# Patient Record
Sex: MALE | URBAN - NONMETROPOLITAN AREA
[De-identification: names, ages, dates, MRNs, and addresses within clinical notes are randomized per-mention and may not be internally consistent; named-entity substitution may affect disease eponyms.]

---

## 2021-04-21 ENCOUNTER — OFFICE VISIT (OUTPATIENT)
Dept: URBAN - NONMETROPOLITAN AREA CLINIC 13 | Facility: CLINIC | Age: 56
End: 2021-04-21
Payer: COMMERCIAL

## 2021-04-21 DIAGNOSIS — R10.13 EPIGASTRIC PAIN: ICD-10-CM

## 2021-04-21 DIAGNOSIS — M10.9 GOUT, UNSPECIFIED CAUSE, UNSPECIFIED CHRONICITY, UNSPECIFIED SITE: ICD-10-CM

## 2021-04-21 DIAGNOSIS — D64.9 ANEMIA, UNSPECIFIED TYPE: ICD-10-CM

## 2021-04-21 DIAGNOSIS — Z12.11 COLON CANCER SCREENING: ICD-10-CM

## 2021-04-21 PROCEDURE — 99204 OFFICE O/P NEW MOD 45 MIN: CPT | Performed by: INTERNAL MEDICINE

## 2021-04-21 RX ORDER — LOSARTAN POTASSIUM 50 MG/1
1 TABLET TABLET, FILM COATED ORAL ONCE A DAY
Status: ACTIVE | COMMUNITY

## 2021-04-21 RX ORDER — PANTOPRAZOLE SODIUM 40 MG/1
1 TABLET TABLET, DELAYED RELEASE ORAL ONCE A DAY
Status: ACTIVE | COMMUNITY

## 2021-04-21 RX ORDER — AMLODIPINE BESYLATE 10 MG/1
1 TABLET TABLET ORAL ONCE A DAY
Status: ACTIVE | COMMUNITY

## 2021-04-21 RX ORDER — ALLOPURINOL 300 MG/1
1 TABLET TABLET ORAL ONCE A DAY
Status: ACTIVE | COMMUNITY

## 2021-04-21 RX ORDER — ATENOLOL 100 MG/1
1 TABLET TABLET ORAL ONCE A DAY
Status: ACTIVE | COMMUNITY

## 2021-04-21 NOTE — HPI-TODAY'S VISIT:
4/21/2021: Initial Gastroenterology Clinic Visit  55 year old gentleman wtih past medical history of hypertension, GERD, pre-diabetes, gout, who presents for evaluatoin of epigastric discomfort/right upper quadrant pain.  Mr. Garay notes that he recently injured his knee leading to a gout flare. He was trialed on prednisone, indomethacin, meloxcam, colchicine. During the time he was treated for gout he began to experience epigastric discomfort/right upper quadrant discomfort. He also began to notice dark stool.  He was trialed on sucralfate which led to improvement of his epigastric/right upper quadrant discomfort. He is currently on pantoprazole 40 mg PO daily.   His bowel movements are currently brown.   He does experience mild post-prandial abdominal pressure.   His most recent labs were obtained on 4/19/2021 which showed a  WBC 5.80, hemoglobin 13.1, hematocrit 39.7, platelets 214.. Chemsitry panel and LFTs normal. A fecal globin was obtaned and was negative.  He states he is up to date on his colonoscopy .

## 2021-04-22 PROBLEM — 49436004 ATRIAL FIBRILLATION: Status: ACTIVE | Noted: 2021-04-22

## 2021-04-25 ENCOUNTER — DASHBOARD ENCOUNTERS (OUTPATIENT)
Age: 56
End: 2021-04-25

## 2021-04-25 PROBLEM — 305058001: Status: ACTIVE | Noted: 2021-04-25

## 2021-04-25 PROBLEM — 271737000: Status: ACTIVE | Noted: 2021-04-25

## 2021-04-25 PROBLEM — 79922009: Status: ACTIVE | Noted: 2021-04-21

## 2021-04-25 PROBLEM — 90560007: Status: ACTIVE | Noted: 2021-04-25

## 2021-05-27 ENCOUNTER — OFFICE VISIT (OUTPATIENT)
Dept: URBAN - NONMETROPOLITAN AREA SURGERY CENTER 1 | Facility: SURGERY CENTER | Age: 56
End: 2021-05-27

## 2021-06-09 ENCOUNTER — OFFICE VISIT (OUTPATIENT)
Dept: URBAN - NONMETROPOLITAN AREA CLINIC 13 | Facility: CLINIC | Age: 56
End: 2021-06-09